# Patient Record
Sex: FEMALE | Race: WHITE
[De-identification: names, ages, dates, MRNs, and addresses within clinical notes are randomized per-mention and may not be internally consistent; named-entity substitution may affect disease eponyms.]

---

## 2020-03-12 ENCOUNTER — HOSPITAL ENCOUNTER (OUTPATIENT)
Dept: HOSPITAL 53 - M SDC | Age: 45
LOS: 1 days | Discharge: HOME | End: 2020-03-13
Attending: UROLOGY
Payer: COMMERCIAL

## 2020-03-12 VITALS — SYSTOLIC BLOOD PRESSURE: 161 MMHG | DIASTOLIC BLOOD PRESSURE: 94 MMHG

## 2020-03-12 VITALS — DIASTOLIC BLOOD PRESSURE: 75 MMHG | SYSTOLIC BLOOD PRESSURE: 127 MMHG

## 2020-03-12 VITALS — SYSTOLIC BLOOD PRESSURE: 122 MMHG | DIASTOLIC BLOOD PRESSURE: 75 MMHG

## 2020-03-12 VITALS — WEIGHT: 209.99 LBS | HEIGHT: 67 IN | BODY MASS INDEX: 32.96 KG/M2

## 2020-03-12 VITALS — SYSTOLIC BLOOD PRESSURE: 121 MMHG | DIASTOLIC BLOOD PRESSURE: 79 MMHG

## 2020-03-12 VITALS — SYSTOLIC BLOOD PRESSURE: 145 MMHG | DIASTOLIC BLOOD PRESSURE: 72 MMHG

## 2020-03-12 VITALS — DIASTOLIC BLOOD PRESSURE: 85 MMHG | SYSTOLIC BLOOD PRESSURE: 152 MMHG

## 2020-03-12 DIAGNOSIS — N39.3: ICD-10-CM

## 2020-03-12 DIAGNOSIS — L90.5: ICD-10-CM

## 2020-03-12 DIAGNOSIS — N88.8: ICD-10-CM

## 2020-03-12 DIAGNOSIS — N36.41: ICD-10-CM

## 2020-03-12 DIAGNOSIS — Z88.8: ICD-10-CM

## 2020-03-12 DIAGNOSIS — N80.0: ICD-10-CM

## 2020-03-12 DIAGNOSIS — N90.89: ICD-10-CM

## 2020-03-12 DIAGNOSIS — Z91.013: ICD-10-CM

## 2020-03-12 DIAGNOSIS — N94.10: Primary | ICD-10-CM

## 2020-03-12 LAB
HCT VFR BLD AUTO: 41.9 % (ref 36–47)
HGB BLD-MCNC: 14.3 G/DL (ref 12–15.5)
MCH RBC QN AUTO: 30.8 PG (ref 27–33)
MCHC RBC AUTO-ENTMCNC: 34.1 G/DL (ref 32–36.5)
MCV RBC AUTO: 90.1 FL (ref 80–96)
PLATELET # BLD AUTO: 226 10^3/UL (ref 150–450)
RBC # BLD AUTO: 4.65 10^6/UL (ref 4–5.4)
WBC # BLD AUTO: 7.9 10^3/UL (ref 4–10)

## 2020-03-12 PROCEDURE — 88302 TISSUE EXAM BY PATHOLOGIST: CPT

## 2020-03-12 PROCEDURE — 86850 RBC ANTIBODY SCREEN: CPT

## 2020-03-12 PROCEDURE — 57288 REPAIR BLADDER DEFECT: CPT

## 2020-03-12 PROCEDURE — 86900 BLOOD TYPING SEROLOGIC ABO: CPT

## 2020-03-12 PROCEDURE — 96361 HYDRATE IV INFUSION ADD-ON: CPT

## 2020-03-12 PROCEDURE — 13131 CMPLX RPR F/C/C/M/N/AX/G/H/F: CPT

## 2020-03-12 PROCEDURE — 96360 HYDRATION IV INFUSION INIT: CPT

## 2020-03-12 PROCEDURE — 85027 COMPLETE CBC AUTOMATED: CPT

## 2020-03-12 PROCEDURE — 86901 BLOOD TYPING SEROLOGIC RH(D): CPT

## 2020-03-12 PROCEDURE — 81025 URINE PREGNANCY TEST: CPT

## 2020-03-12 PROCEDURE — 88307 TISSUE EXAM BY PATHOLOGIST: CPT

## 2020-03-12 PROCEDURE — 58262 VAG HYST INCLUDING T/O: CPT

## 2020-03-12 PROCEDURE — 36415 COLL VENOUS BLD VENIPUNCTURE: CPT

## 2020-03-12 RX ADMIN — FENTANYL CITRATE PRN MCG: 50 INJECTION, SOLUTION INTRAMUSCULAR; INTRAVENOUS at 15:05

## 2020-03-12 RX ADMIN — Medication PRN MG: at 14:50

## 2020-03-12 RX ADMIN — FENTANYL CITRATE PRN MCG: 50 INJECTION, SOLUTION INTRAMUSCULAR; INTRAVENOUS at 15:10

## 2020-03-12 RX ADMIN — FENTANYL CITRATE PRN MCG: 50 INJECTION, SOLUTION INTRAMUSCULAR; INTRAVENOUS at 16:20

## 2020-03-12 RX ADMIN — FENTANYL CITRATE PRN MCG: 50 INJECTION, SOLUTION INTRAMUSCULAR; INTRAVENOUS at 15:15

## 2020-03-12 RX ADMIN — Medication PRN MG: at 15:30

## 2020-03-12 RX ADMIN — FENTANYL CITRATE PRN MCG: 50 INJECTION, SOLUTION INTRAMUSCULAR; INTRAVENOUS at 15:00

## 2020-03-12 RX ADMIN — FENTANYL CITRATE PRN MCG: 50 INJECTION, SOLUTION INTRAMUSCULAR; INTRAVENOUS at 16:30

## 2020-03-12 RX ADMIN — FENTANYL CITRATE PRN MCG: 50 INJECTION, SOLUTION INTRAMUSCULAR; INTRAVENOUS at 16:25

## 2020-03-12 RX ADMIN — SODIUM CHLORIDE, POTASSIUM CHLORIDE, SODIUM LACTATE AND CALCIUM CHLORIDE SCH MLS/HR: 600; 310; 30; 20 INJECTION, SOLUTION INTRAVENOUS at 16:01

## 2020-03-12 RX ADMIN — FENTANYL CITRATE PRN MCG: 50 INJECTION, SOLUTION INTRAMUSCULAR; INTRAVENOUS at 16:15

## 2020-03-12 RX ADMIN — SODIUM CHLORIDE, POTASSIUM CHLORIDE, SODIUM LACTATE AND CALCIUM CHLORIDE SCH MLS/HR: 600; 310; 30; 20 INJECTION, SOLUTION INTRAVENOUS at 21:53

## 2020-03-12 NOTE — RO
DATE OF PROCEDURE:  03/12/2020

 

PREOPERATIVE DIAGNOSIS AND INDICATION FOR SURGERY:  Pain, dyspareunia, stress

urinary incontinence, urethral hypermobility, etc.  Painful left vulvar scar.

 

POSTOPERATIVE DIAGNOSIS: Pain, dyspareunia, stress urinary incontinence, urethral

hypermobility, etc. Painful left vulvar scar.

 

PROCEDURE: Total vaginal hysterectomy with right salpingectomy, left tube was too

high to reach. Altis midurethral sling, cystourethroscopy, and removal of left

vulvar scar with, of course, revision there.

 

SURGEON: Charlotte Sebastian MD

 

ASSISTANT: None.

 

ANESTHESIA: General endotracheal anesthesia.

 

DESCRIPTION OF PROCEDURE:  Alejandra was brought to the operating room where

sufficient general endotracheal anesthesia was induced.  She was prepped, draped

and positioned in the usual sterile fashion, the weighted speculum placed and the

bladder emptied. And then tenacula were placed on the anterior and posterior

cervix and a circumferential incision made around the base of the cervix with the

cardinal ligaments then isolated, clamped with Tomy VelaHughes clamps, which were used

throughout, transected and then ligated with #0 Vicryl suture, which was used

throughout the rest of this portion of the case. Then, isolated, clamped,

transected and ligated the uterosacrals, which were held for later reattachment

to the cuff, and then dissected the posterior reflection to enter the peritoneum

posteriorly. We then continued the dissection anteriorly create the bladder flap

and then the uterine vasculature was carefully clamped, transected, and ligated

along the lateral aspect of the uterus until the uterus could be delivered. With

the uterus out of the way, we were then able to visualize the pedicles. There was

good hemostasis at the vascular pedicles at this point. The right tube was in a

position where with a [place] we could bring it down, and we carefully clamped

across the mesentery of this tube and delivered it, ligating the mesentery, of

course, with #0 Vicryl suture. On the left side, the tube disappeared up into the

abdomen fairly rapidly, and even with some gentle efforts to bring that tube

down, we were unable to really visualize it well enough to work there, so we were

unable to deliver the left tube.

 

We then began closing the cuff and along the left side at the angle of the cuff,

there was a pumping vessel, which needed to be oversewn with the #0 Vicryl and

then observed. And we were able to get that under control. We then completed

angle stitches, both right and left, and then closed the cuff in the usual

fashion, of course, incorporating the uterosacrals into the closure. There was

good approximation and hemostasis.

 

At this point, decision was made to take the elevated scar from the patient's

previous obstetric injury at the posterior left vulva and take that off and do

that revision.  So, this area was elevated with an Allis clamp and carefully

incised with a #15 blade and oversewn with #4-0 Monocryl to close that wound and

try to create a flat surface for this patient for whom this painful scarred area

was also contributing to her dyspareunia as was her uterine tenderness. With that

removal and revision completed, we then turned our attention to the Altis

midurethral sling.

 

An approximately 2 cm anterior vaginal wall incision was made after injection of

vasopressin. This was also carried out laterally. The Strully scissors were used

to dissect out toward the obturator membrane bilaterally. I was able to place my

finger on both sides to feel this and also to feel that this was away from the

reflection of the vaginal sulcus so that we were not too shallow but we also were

not too deep. And we were then able to place first on the right side, then the

left and carefully tension this so that it was just touching but not tight. Then,

we closed that vaginal wound and did cystourethroscopy, which showed normal jets

of urine, no injury to the bladder wall from the hysterectomy, and no injury,

especially just lateral to the trigone. And in that proximal bladder, no injury

from the midurethral sling, nor did the urethra show any injury from the

midurethral sling.

 

We did place vaginal packing at the end of the case. There had been some mild

oozing during the dissection for the Altis, and, of course, we had already had

that bleeding at the angle, so I wanted to minimize additional oozing. So, we

went ahead and placed a vaginal pack and a Moreno, and the case was then ended.

 

Estimated blood loss for the procedure:  About 125 mL.

 

Fluid replacement was crystalloid.

 

Complications: None.

 

Condition and Disposition: Alejandra tolerated the procedure well and was

recovering in the recovery room in good condition.

## 2020-03-13 VITALS — DIASTOLIC BLOOD PRESSURE: 69 MMHG | SYSTOLIC BLOOD PRESSURE: 129 MMHG

## 2020-03-13 VITALS — DIASTOLIC BLOOD PRESSURE: 83 MMHG | SYSTOLIC BLOOD PRESSURE: 134 MMHG

## 2020-03-13 VITALS — DIASTOLIC BLOOD PRESSURE: 74 MMHG | SYSTOLIC BLOOD PRESSURE: 121 MMHG

## 2020-03-13 VITALS — SYSTOLIC BLOOD PRESSURE: 123 MMHG | DIASTOLIC BLOOD PRESSURE: 69 MMHG

## 2020-03-13 LAB
HCT VFR BLD AUTO: 32.8 % (ref 36–47)
HGB BLD-MCNC: 11 G/DL (ref 12–15.5)
MCH RBC QN AUTO: 30.1 PG (ref 27–33)
MCHC RBC AUTO-ENTMCNC: 33.5 G/DL (ref 32–36.5)
MCV RBC AUTO: 89.6 FL (ref 80–96)
PLATELET # BLD AUTO: 201 10^3/UL (ref 150–450)
RBC # BLD AUTO: 3.66 10^6/UL (ref 4–5.4)
WBC # BLD AUTO: 12.7 10^3/UL (ref 4–10)

## 2020-03-13 RX ADMIN — SODIUM CHLORIDE, POTASSIUM CHLORIDE, SODIUM LACTATE AND CALCIUM CHLORIDE SCH MLS/HR: 600; 310; 30; 20 INJECTION, SOLUTION INTRAVENOUS at 04:38

## 2020-03-13 RX ADMIN — HYDROCODONE BITARTRATE AND ACETAMINOPHEN PRN TAB: 5; 325 TABLET ORAL at 15:00

## 2020-03-13 RX ADMIN — HYDROCODONE BITARTRATE AND ACETAMINOPHEN PRN TAB: 5; 325 TABLET ORAL at 08:37

## 2020-08-12 ENCOUNTER — HOSPITAL ENCOUNTER (OUTPATIENT)
Dept: HOSPITAL 53 - M LAB | Age: 45
End: 2020-08-12
Attending: PHYSICIAN ASSISTANT
Payer: COMMERCIAL

## 2020-08-12 DIAGNOSIS — M25.541: Primary | ICD-10-CM

## 2020-09-12 LAB
BASOPHILS # BLD AUTO: 0.1 10^3/UL (ref 0–0.2)
BASOPHILS NFR BLD AUTO: 0.4 % (ref 0–1)
EOSINOPHIL # BLD AUTO: 0.2 10^3/UL (ref 0–0.5)
EOSINOPHIL NFR BLD AUTO: 1.9 % (ref 0–3)
ERYTHROCYTE [SEDIMENTATION RATE] IN BLOOD BY WESTERGREN METHOD: (no result) MM/HR (ref 0–20)
HCT VFR BLD AUTO: 42.8 % (ref 36–47)
HGB BLD-MCNC: 14.2 G/DL (ref 12–15.5)
LYMPHOCYTES # BLD AUTO: 2.8 10^3/UL (ref 1.5–5)
LYMPHOCYTES NFR BLD AUTO: 22.6 % (ref 24–44)
MCH RBC QN AUTO: 29.7 PG (ref 27–33)
MCHC RBC AUTO-ENTMCNC: 33.2 G/DL (ref 32–36.5)
MCV RBC AUTO: 89.5 FL (ref 80–96)
MONOCYTES # BLD AUTO: 0.8 10^3/UL (ref 0–0.8)
MONOCYTES NFR BLD AUTO: 6.2 % (ref 0–5)
NEUTROPHILS # BLD AUTO: 8.5 10^3/UL (ref 1.5–8.5)
NEUTROPHILS NFR BLD AUTO: 67.9 % (ref 36–66)
PLATELET # BLD AUTO: 274 10^3/UL (ref 150–450)
RBC # BLD AUTO: 4.78 10^6/UL (ref 4–5.4)
WBC # BLD AUTO: 12.5 10^3/UL (ref 4–10)

## 2020-09-26 LAB
CRP SERPL-MCNC: 0.36 MG/DL (ref 0–0.3)
URATE SERPL-MCNC: 5.4 MG/DL (ref 2.6–6)

## 2021-01-13 ENCOUNTER — HOSPITAL ENCOUNTER (OUTPATIENT)
Dept: HOSPITAL 53 - M LAB REF | Age: 46
End: 2021-01-13
Attending: PHYSICIAN ASSISTANT
Payer: COMMERCIAL

## 2021-01-13 DIAGNOSIS — N39.46: Primary | ICD-10-CM
